# Patient Record
Sex: FEMALE | Race: WHITE | NOT HISPANIC OR LATINO | Employment: STUDENT | URBAN - METROPOLITAN AREA
[De-identification: names, ages, dates, MRNs, and addresses within clinical notes are randomized per-mention and may not be internally consistent; named-entity substitution may affect disease eponyms.]

---
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
## 2023-02-28 ENCOUNTER — OFFICE VISIT (OUTPATIENT)
Dept: NEUROLOGY | Facility: CLINIC | Age: 23
End: 2023-02-28

## 2023-02-28 VITALS
OXYGEN SATURATION: 99 % | TEMPERATURE: 98.2 F | DIASTOLIC BLOOD PRESSURE: 78 MMHG | WEIGHT: 169 LBS | HEART RATE: 98 BPM | SYSTOLIC BLOOD PRESSURE: 110 MMHG

## 2023-02-28 DIAGNOSIS — R55 SYNCOPE: Primary | ICD-10-CM

## 2023-02-28 NOTE — PATIENT INSTRUCTIONS
Schedule EEG  Talk to PCP about additional workup for syncope  Could consider Cardiology evaluation  Return as needed

## 2023-02-28 NOTE — PROGRESS NOTES
[place] [address] [facility] [address] [facility]  Initial Consultation    Impression/Plan    Ms Dallas Pittman is a 25 y o  female with syncope  Prodrome and lack of postictal state support syncope rather than seizure  All have occurred while upright  Prematurity, but no other seizure risk factors  Obtaining EEG to further evaluate seizure risk, but clinical concern for seizure is low  Her neurological exam is normal  Orthostatic vital signs negative today  We discussed the pathophysiology of epilepsy/seizure/syncope and safety/precautions  Patient Instructions   1  Schedule EEG  2  Talk to PCP about additional workup for syncope  Could consider Cardiology evaluation  3  Return as needed  Diagnoses and all orders for this visit:    Syncope  -     EEG Awake and asleep; Future        Subjective    Sharif Euceda is a 25 y o  left handed female presenting to the [place] [address] [facility] for evaluation of syncope  This is her first visit with a neurologist   She has never seen cardiology in the past   She was referred to see us today after events increased some in frequency and eyewitness of her most recent event mentioned that her event looks like an absence seizure  History is from the patient and review of the records  Davidliz stared at 14  Gets hot and clammy  Feet and legs fell weird and may try to move to make sure she can feel them  Vision starts to go out as legs feel weird  Triggered by stress and sleep deprivation  Emotional situations can bring them on, but acutely necessarily (happen during a "bad week")  Prodrome happens about every 6 months without passing out  Lately it's been maybe every couple months  Usually she is out for about 15 seconds and then starts hearing for 15 seconds and then opens eyes within 30 seconds from onset  Someone described a little bit of jerking with the most recent event, but never heard of that before  All have happened when standing   All have had a warning  May feel heart rate increase, like having panic attack, but doesn't feel anxious  Usually has 2-5 minutes of warning  No vasovagal trigger  Does not get lightheaded when standing  Most recent event that progressed to loss of awareness occurred on Thanksgiving  She was at her boyfriend's family's house near 4 PM   They had just finished the cooking  She felt like the house was too warm and stepped outside briefly to cool down  When she came back in she experienced her typical prodrome  She was standing  Felt hot, legs started giving out  Told her boyfriend that she was about to pass out and reached for him  He was able to support her and lowered her to the ground as she lost awareness  She was told she was out for 30 to 60 seconds  Nurse that saw event at [address] looked like an absence seizure  September and June were the prior events  This is more freuqnet than in past  Was 8-12 months in the past  None since November, but has had warning  Will sit and concentrate on feeling better and it can be aborted  Stopped smoking marijuana and went back on OCP in the past year  No other significant changes  Works as  and   Often on feet and on the go  Events have occurred at work in the past     History of anemia  COVID+ June of 2021 without symptoms  She does not feel there is any inappropriate tachycardia (no suggestion of POTS)  Current AEDs:  None    Event/Seizure semiology:  Apparent syncope with typical prodrome, see HPI    Special Features  Status epilepticus: no  Self Injury Seizures: Head strike in the past, had head CT in 2017 that was unremarkable  Precipitating Factors: Sleep deprivation, increased stress    Epilepsy Risk Factors:  2 months premature, 3 lbs 15 oz, twin  One month in NICU  Strabismus surgery at 5 yo  Twin sister passed out once in elementary school  No family history of seizures      Prior AEDs:  None    Prior Evaluation:  No EEG, no brain MRI    History Reviewed: The following were reviewed and updated as appropriate: allergies, current medications, past family history, past medical history, past social history, past surgical history and problem list  Status post strabismus surgery at the age of 6  Prematurity, twin  Anemia    Psychiatric History:  None    Social History:   Lives Alone: Lives with significant other  Occupation:         Objective    /78 (BP Location: Right arm, Patient Position: Sitting, Cuff Size: Standard)   Pulse 98   Temp 98 2 °F (36 8 °C) (Temporal)   Wt 76 7 kg (169 lb)   SpO2 99%      Vitals:    02/28/23 1020 02/28/23 1109 02/28/23 1110 02/28/23 1114   BP: 110/80 118/80 110/80 110/78   BP Location: Left arm Right arm Right arm Right arm   Patient Position: Sitting Supine Sitting Sitting   Cuff Size: Standard Standard Standard Standard   Pulse: 74 60 70 98   Temp: 98 2 °F (36 8 °C)      TempSrc: Temporal      SpO2: 99%      Weight: 76 7 kg (169 lb)            General Exam  General: well developed, no acute distress  HEENT: mucous membranes moist, anicteric sclera  Neck: good ROM  Neurological Exam  Mental Status: awake, alert, and fully oriented to person, place, time, and situation  Attention  intact  Fund of knowledge is appropriate for age and education  There is no neglect  Language: fluency, naming, comprehension normal        Cranial Nerves: Pupils equal and reactive to light  Visual fields full to confrontation  Extraocular motions intact with full versions, normal pursuits and saccades  Facial strength full and symmetric  Facial sensation intact in V1-V3  Hearing intact to finger rub bilaterally  Tongue protrudes to midline  Palate elevates symmetrically  Speech clear without notable dysarthria  Shoulder shrug activation full and symmetric  Motor: Normal bulk and tone  No pronator drift  Strength is 5/5 proximally and distally in all 4 extremities  No involuntary movements  Sensory: Sensation intact to light touch distally in all extremities  Coordination: Normal finger-to-nose  Station and gait: Casual and tandem gait normal  Normal Romberg  Reflexes: Brisk at the ankles, but no clonus  Otherwise, reflexes 2+ throughout and symmetric  Both toes down going              Jaye Manriquez MD   [facility]  [facility] [address] [facility]

## 2024-03-16 ENCOUNTER — HOSPITAL ENCOUNTER (EMERGENCY)
Facility: HOSPITAL | Age: 24
Discharge: HOME | End: 2024-03-16
Attending: EMERGENCY MEDICINE

## 2024-03-16 VITALS
RESPIRATION RATE: 16 BRPM | DIASTOLIC BLOOD PRESSURE: 77 MMHG | SYSTOLIC BLOOD PRESSURE: 125 MMHG | OXYGEN SATURATION: 100 % | TEMPERATURE: 96.1 F | HEART RATE: 96 BPM

## 2024-03-16 DIAGNOSIS — F10.929 ALCOHOL INTOXICATION (HCC): Primary | ICD-10-CM

## 2024-03-16 PROCEDURE — 99282 EMERGENCY DEPT VISIT SF MDM: CPT | Performed by: EMERGENCY MEDICINE

## 2024-03-16 PROCEDURE — 99284 EMERGENCY DEPT VISIT MOD MDM: CPT

## 2024-03-16 PROCEDURE — NC001 PR NO CHARGE: Performed by: EMERGENCY MEDICINE

## 2024-03-16 NOTE — ED ATTENDING ATTESTATION
3/16/2024  IAndrew MD, saw and evaluated the patient. I have discussed the patient with the resident/non-physician practitioner and agree with the resident's/non-physician practitioner's findings, Plan of Care, and MDM as documented in the resident's/non-physician practitioner's note, except where noted. All available labs and Radiology studies were reviewed.  I was present for key portions of any procedure(s) performed by the resident/non-physician practitioner and I was immediately available to provide assistance.       At this point I agree with the current assessment done in the Emergency Department.  I have conducted an independent evaluation of this patient a history and physical is as follows:    ED Course   Emergency Department Note- Blanca Lindsey 23 y.o. female MRN: [id number]    Unit/Bed#: ED 07 Encounter: [id number]    Blanca Lindsey is a 23 y.o. female who presents with   Chief Complaint   Patient presents with    Psychiatric Evaluation     Pt intoxicated and reports not being safe alone with herself. Denies SI/HI but states she thinks of what if she wasn't here          History of Present Illness   HPI:  Blanca Lindsey is a 23 y.o. female who presents for evaluation of:  Depression, anxiety, and intoxication.  Patient feels anxious after her friend was admitted for alcohol intoxication earlier this evening.  Patient denies suicidal and homicidal ideation.  Patient denies illicit drug abuse.  Patient was drinking alcohol earlier but not to harm himself.  Patient denies other ingestions.    Review of Systems   Constitutional:  Negative for fatigue and fever.   HENT:  Negative for congestion and sore throat.    Respiratory:  Negative for cough and shortness of breath.    Cardiovascular:  Negative for chest pain and palpitations.   Gastrointestinal:  Negative for abdominal pain and nausea.   Genitourinary:  Negative for flank pain and frequency.   Neurological:  Negative for light-headedness  and headaches.   Psychiatric/Behavioral:  Positive for dysphoric mood. Negative for hallucinations. The patient is nervous/anxious.    All other systems reviewed and are negative.      Historical Information   Past Medical History:   Diagnosis Date    Hormone imbalance     Seasonal allergies      Past Surgical History:   Procedure Laterality Date    EYE SURGERY       Social History   Social History     Substance and Sexual Activity   Alcohol Use Yes    Comment: social     Social History     Substance and Sexual Activity   Drug Use Never     Social History     Tobacco Use   Smoking Status Never   Smokeless Tobacco Never     Family History: History reviewed. No pertinent family history.    Meds/Allergies   PTA meds:   Prior to Admission Medications   Prescriptions Last Dose Informant Patient Reported? Taking?   Multiple Vitamin (MULTIVITAMIN) tablet   Yes No   Sig: Take 1 tablet by mouth daily   cyclobenzaprine (FLEXERIL) 10 mg tablet   No No   Sig: Take 1 tablet (10 mg total) by mouth 2 (two) times a day as needed for muscle spasms   ibuprofen (MOTRIN) 600 mg tablet   No No   Sig: Take 1 tablet by mouth every 8 (eight) hours as needed for moderate pain   norethindrone-ethinyl estradiol 0.5/0.75/1-35 MG-MCG per tablet   Yes No   Sig: Take 1 tablet by mouth daily      Facility-Administered Medications: None     No Known Allergies    Objective   First Vitals:   Blood Pressure: 125/77 (03/16/24 0235)  Pulse: 96 (03/16/24 0233)  Temperature: (!) 96.1 °F (35.6 °C) (03/16/24 0233)  Temp Source: Oral (03/16/24 0233)  Respirations: 16 (03/16/24 0233)  SpO2: 100 % (03/16/24 0233)    Current Vitals:   Blood Pressure: 125/77 (03/16/24 0235)  Pulse: 96 (03/16/24 0233)  Temperature: (!) 96.1 °F (35.6 °C) (03/16/24 0233)  Temp Source: Oral (03/16/24 0233)  Respirations: 16 (03/16/24 0233)  SpO2: 100 % (03/16/24 0233)    No intake or output data in the 24 hours ending 03/16/24 0316    Invasive Devices       None              "      Physical Exam  Vitals and nursing note reviewed.   Constitutional:       General: She is not in acute distress.     Appearance: Normal appearance. She is well-developed.   HENT:      Head: Normocephalic and atraumatic.      Right Ear: External ear normal.      Left Ear: External ear normal.      Nose: Nose normal.      Mouth/Throat:      Pharynx: No oropharyngeal exudate.   Eyes:      Conjunctiva/sclera: Conjunctivae normal.      Pupils: Pupils are equal, round, and reactive to light.   Cardiovascular:      Rate and Rhythm: Normal rate and regular rhythm.   Pulmonary:      Effort: Pulmonary effort is normal. No respiratory distress.   Abdominal:      General: Abdomen is flat. There is no distension.      Palpations: Abdomen is soft.   Musculoskeletal:         General: No deformity. Normal range of motion.      Cervical back: Normal range of motion and neck supple.   Skin:     General: Skin is warm and dry.      Capillary Refill: Capillary refill takes less than 2 seconds.   Neurological:      General: No focal deficit present.      Mental Status: She is alert and oriented to person, place, and time. Mental status is at baseline.      Coordination: Coordination normal.   Psychiatric:         Mood and Affect: Mood normal.         Behavior: Behavior normal.         Thought Content: Thought content normal.         Judgment: Judgment normal.           Medical Decision Makin.  Acute alcohol intoxication: Plan to observe until sober.  2.  Depression and anxiety: Will reevaluate when the patient is sober.    No results found for this or any previous visit (from the past 36 hour(s)).  No orders to display         Portions of the record may have been created with voice recognition software. Occasional wrong word or \"sound a like\" substitutions may have occurred due to the inherent limitations of voice recognition software.  Read the chart carefully and recognize, using context, where substitutions have " occurred.          Critical Care Time  Procedures

## 2024-03-16 NOTE — ED PROVIDER NOTES
Emergency Department Sign Out Note        Sign out and transfer of care from Dr. Calhoun. See Separate Emergency Department note.     The patient, Blanca Lindsey, was evaluated by the previous provider for alcohol intoxication.        ED Course / Workup Pending (followup):  Patient is alert, awake, with clear speech and steady gait. No SI or HI on reassessment. Patient has friends here who will take her home.     I gave oral return precautions for what to return for in addition to the written return precautions.   The patient verbalized understanding of the discharge instructions and warnings that would necessitate return to the Emergency Department.  I specifically highlighted areas of special concern regarding the written and verbal discharge instructions and return precautions.    All questions were answered prior to discharge.                                    ED Course as of 03/16/24 0819   Sat Mar 16, 2024   0705 SO: reasess once sober for SI; can go if she wants     Procedures  Medical Decision Making          Disposition  Final diagnoses:   Alcohol intoxication (HCC)     Time reflects when diagnosis was documented in both MDM as applicable and the Disposition within this note       Time User Action Codes Description Comment    3/16/2024  3:51 AM Collin Calhoun Add [F10.929] Alcohol intoxication (HCC)           ED Disposition       ED Disposition   Discharge    Condition   Stable    Date/Time   Sat Mar 16, 2024  3:51 AM    Comment   Blanca Lindsey discharge to home/self care.                   Follow-up Information       Follow up With Specialties Details Why Contact Info    Shoshana Lyn MD Internal Medicine Schedule an appointment as soon as possible for a visit  As needed [address] [facility]  [address]  [facility] [id number] [phone]            Patient's Medications   Discharge Prescriptions    No medications on file     No discharge procedures on file.       ED Provider  Electronically Signed by      Oumar Marin,   03/16/24 0819

## 2024-03-16 NOTE — ED PROVIDER NOTES
History  Chief Complaint   Patient presents with    Psychiatric Evaluation     Pt intoxicated and reports not being safe alone with herself. Denies SI/HI but states she thinks of what if she wasn't here      HPI  Patient is a 23-year-old female presenting for alcohol intoxication and complaints of feeling sad.  Denies any SI, HI, AH/VH.  Patient has no other complaints.  Prior to Admission Medications   Prescriptions Last Dose Informant Patient Reported? Taking?   Multiple Vitamin (MULTIVITAMIN) tablet   Yes No   Sig: Take 1 tablet by mouth daily   cyclobenzaprine (FLEXERIL) 10 mg tablet   No No   Sig: Take 1 tablet (10 mg total) by mouth 2 (two) times a day as needed for muscle spasms   ibuprofen (MOTRIN) 600 mg tablet   No No   Sig: Take 1 tablet by mouth every 8 (eight) hours as needed for moderate pain   norethindrone-ethinyl estradiol 0.5/0.75/1-35 MG-MCG per tablet   Yes No   Sig: Take 1 tablet by mouth daily      Facility-Administered Medications: None       Past Medical History:   Diagnosis Date    Hormone imbalance     Seasonal allergies        Past Surgical History:   Procedure Laterality Date    EYE SURGERY         History reviewed. No pertinent family history.  I have reviewed and agree with the history as documented.    E-Cigarette/Vaping    E-Cigarette Use Never User      E-Cigarette/Vaping Substances    Nicotine Yes     THC No     CBD No     Flavoring No     Other No     Unknown No      Social History     Tobacco Use    Smoking status: Never    Smokeless tobacco: Never   Vaping Use    Vaping status: Never Used   Substance Use Topics    Alcohol use: Yes     Comment: social    Drug use: Never        Review of Systems   Constitutional: Negative.    HENT: Negative.     Eyes: Negative.    Respiratory: Negative.     Cardiovascular: Negative.    Gastrointestinal: Negative.    Endocrine: Negative.    Genitourinary: Negative.    Musculoskeletal: Negative.    Skin: Negative.    Allergic/Immunologic: Negative.     Neurological: Negative.    Hematological: Negative.    Psychiatric/Behavioral:          Sad and intoxicated   All other systems reviewed and are negative.      Physical Exam  ED Triage Vitals   Temperature Pulse Respirations Blood Pressure SpO2   03/16/24 0233 03/16/24 0233 03/16/24 0233 03/16/24 0235 03/16/24 0233   (!) 96.1 °F (35.6 °C) 96 16 125/77 100 %      Temp Source Heart Rate Source Patient Position - Orthostatic VS BP Location FiO2 (%)   03/16/24 0233 03/16/24 0233 03/16/24 0233 03/16/24 0233 --   Oral Monitor Sitting Left arm       Pain Score       --                    Orthostatic Vital Signs  Vitals:    03/16/24 0233 03/16/24 0235   BP:  125/77   Pulse: 96    Patient Position - Orthostatic VS: Sitting        Physical Exam  Vitals and nursing note reviewed.   Constitutional:       Appearance: Normal appearance. She is normal weight.      Comments: Patient clinically intoxicated   HENT:      Head: Normocephalic and atraumatic.      Right Ear: Tympanic membrane, ear canal and external ear normal.      Left Ear: Tympanic membrane, ear canal and external ear normal.      Nose: Nose normal.      Mouth/Throat:      Mouth: Mucous membranes are moist.      Pharynx: Oropharynx is clear.   Eyes:      Extraocular Movements: Extraocular movements intact.      Conjunctiva/sclera: Conjunctivae normal.      Pupils: Pupils are equal, round, and reactive to light.   Cardiovascular:      Rate and Rhythm: Normal rate and regular rhythm.      Pulses: Normal pulses.      Heart sounds: Normal heart sounds.   Pulmonary:      Effort: Pulmonary effort is normal.      Breath sounds: Normal breath sounds.   Abdominal:      General: Abdomen is flat. Bowel sounds are normal.      Palpations: Abdomen is soft.   Musculoskeletal:         General: Normal range of motion.      Cervical back: Normal range of motion and neck supple.   Skin:     General: Skin is warm and dry.      Capillary Refill: Capillary refill takes less than 2  seconds.   Neurological:      General: No focal deficit present.      Mental Status: She is alert and oriented to person, place, and time.   Psychiatric:         Mood and Affect: Mood normal.         Behavior: Behavior normal.         Thought Content: Thought content normal.         Judgment: Judgment normal.      Comments: Denies SI, HI, AH/VH         ED Medications  Medications - No data to display    Diagnostic Studies  Results Reviewed       None                   No orders to display         Procedures  Procedures      ED Course                                       Medical Decision Making  Patient is 23-year-old female presenting for alcohol intoxication.  DDx: Alcohol intoxication  Plan to observe in emergency room and reassess when sober.  Plans for discharge when sober swollen still denies SI, HI, AH/VH.  Signed out to morning team.    Problems Addressed:  Alcohol intoxication (HCC): acute illness or injury          Disposition  Final diagnoses:   Alcohol intoxication (HCC)     Time reflects when diagnosis was documented in both MDM as applicable and the Disposition within this note       Time User Action Codes Description Comment    3/16/2024  3:51 AM Collin Calhoun Add [F10.929] Alcohol intoxication (HCC)           ED Disposition       ED Disposition   Discharge    Condition   Stable    Date/Time   Sat Mar 16, 2024  3:51 AM    Comment   Blanca Lindsey discharge to home/self care.                   Follow-up Information    None         Patient's Medications   Discharge Prescriptions    No medications on file     No discharge procedures on file.    PDMP Review       None             ED Provider  Attending physically available and evaluated Blanca Lindsey. I managed the patient along with the ED Attending.    Electronically Signed by           Collin Calhoun MD  03/16/24 0712